# Patient Record
Sex: FEMALE | Race: WHITE | Employment: UNEMPLOYED | URBAN - METROPOLITAN AREA
[De-identification: names, ages, dates, MRNs, and addresses within clinical notes are randomized per-mention and may not be internally consistent; named-entity substitution may affect disease eponyms.]

---

## 2017-02-15 ENCOUNTER — OFFICE VISIT (OUTPATIENT)
Dept: ORTHOPEDIC SURGERY | Age: 77
End: 2017-02-15

## 2017-02-15 VITALS
HEART RATE: 86 BPM | BODY MASS INDEX: 22.09 KG/M2 | DIASTOLIC BLOOD PRESSURE: 76 MMHG | SYSTOLIC BLOOD PRESSURE: 155 MMHG | RESPIRATION RATE: 16 BRPM | HEIGHT: 61 IN | WEIGHT: 117 LBS

## 2017-02-15 DIAGNOSIS — S72.142A INTERTROCHANTERIC FRACTURE OF LEFT FEMUR, CLOSED, INITIAL ENCOUNTER (HCC): Primary | ICD-10-CM

## 2017-02-15 PROCEDURE — 99213 OFFICE O/P EST LOW 20 MIN: CPT | Performed by: ORTHOPAEDIC SURGERY

## 2017-02-15 PROCEDURE — 73502 X-RAY EXAM HIP UNI 2-3 VIEWS: CPT | Performed by: ORTHOPAEDIC SURGERY

## 2018-08-06 ENCOUNTER — TELEPHONE (OUTPATIENT)
Dept: ORTHOPEDIC SURGERY | Age: 78
End: 2018-08-06

## 2018-08-06 NOTE — TELEPHONE ENCOUNTER
Called and left message to call office back    ** from what record we have the patient was under general anesthesia.  If she wants specifics she is to contact the hospital**

## 2020-07-04 ENCOUNTER — APPOINTMENT (OUTPATIENT)
Dept: CT IMAGING | Age: 80
DRG: 512 | End: 2020-07-04
Payer: MEDICARE

## 2020-07-04 ENCOUNTER — HOSPITAL ENCOUNTER (INPATIENT)
Age: 80
LOS: 1 days | Discharge: HOME | DRG: 512 | End: 2020-07-05
Attending: INTERNAL MEDICINE | Admitting: INTERNAL MEDICINE
Payer: MEDICARE

## 2020-07-04 ENCOUNTER — APPOINTMENT (OUTPATIENT)
Dept: GENERAL RADIOLOGY | Age: 80
DRG: 512 | End: 2020-07-04
Payer: MEDICARE

## 2020-07-04 ENCOUNTER — ANESTHESIA (OUTPATIENT)
Dept: OPERATING ROOM | Age: 80
DRG: 512 | End: 2020-07-04
Payer: MEDICARE

## 2020-07-04 ENCOUNTER — ANESTHESIA EVENT (OUTPATIENT)
Dept: OPERATING ROOM | Age: 80
DRG: 512 | End: 2020-07-04
Payer: MEDICARE

## 2020-07-04 VITALS
SYSTOLIC BLOOD PRESSURE: 117 MMHG | DIASTOLIC BLOOD PRESSURE: 55 MMHG | RESPIRATION RATE: 5 BRPM | OXYGEN SATURATION: 100 %

## 2020-07-04 PROBLEM — S62.102B LEFT WRIST FRACTURE, OPEN, INITIAL ENCOUNTER: Status: ACTIVE | Noted: 2020-07-04

## 2020-07-04 LAB
A/G RATIO: 1.4 (ref 1.1–2.2)
ALBUMIN SERPL-MCNC: 4.3 G/DL (ref 3.4–5)
ALP BLD-CCNC: 73 U/L (ref 40–129)
ALT SERPL-CCNC: 7 U/L (ref 10–40)
ANION GAP SERPL CALCULATED.3IONS-SCNC: 12 MMOL/L (ref 3–16)
AST SERPL-CCNC: 20 U/L (ref 15–37)
BASOPHILS ABSOLUTE: 0.1 K/UL (ref 0–0.2)
BASOPHILS RELATIVE PERCENT: 0.5 %
BILIRUB SERPL-MCNC: <0.2 MG/DL (ref 0–1)
BUN BLDV-MCNC: 18 MG/DL (ref 7–20)
CALCIUM SERPL-MCNC: 9.6 MG/DL (ref 8.3–10.6)
CHLORIDE BLD-SCNC: 101 MMOL/L (ref 99–110)
CO2: 23 MMOL/L (ref 21–32)
CREAT SERPL-MCNC: 0.9 MG/DL (ref 0.6–1.2)
EOSINOPHILS ABSOLUTE: 0 K/UL (ref 0–0.6)
EOSINOPHILS RELATIVE PERCENT: 0.4 %
GFR AFRICAN AMERICAN: >60
GFR NON-AFRICAN AMERICAN: 60
GLOBULIN: 3.1 G/DL
GLUCOSE BLD-MCNC: 124 MG/DL (ref 70–99)
HCT VFR BLD CALC: 41.6 % (ref 36–48)
HEMOGLOBIN: 13.8 G/DL (ref 12–16)
LYMPHOCYTES ABSOLUTE: 1.1 K/UL (ref 1–5.1)
LYMPHOCYTES RELATIVE PERCENT: 9.4 %
MCH RBC QN AUTO: 30.9 PG (ref 26–34)
MCHC RBC AUTO-ENTMCNC: 33.2 G/DL (ref 31–36)
MCV RBC AUTO: 93.1 FL (ref 80–100)
MONOCYTES ABSOLUTE: 0.5 K/UL (ref 0–1.3)
MONOCYTES RELATIVE PERCENT: 4.2 %
NEUTROPHILS ABSOLUTE: 10.3 K/UL (ref 1.7–7.7)
NEUTROPHILS RELATIVE PERCENT: 85.5 %
PDW BLD-RTO: 13.9 % (ref 12.4–15.4)
PLATELET # BLD: 264 K/UL (ref 135–450)
PMV BLD AUTO: 9.2 FL (ref 5–10.5)
POTASSIUM REFLEX MAGNESIUM: 4.4 MMOL/L (ref 3.5–5.1)
RBC # BLD: 4.47 M/UL (ref 4–5.2)
SARS-COV-2, NAAT: NOT DETECTED
SODIUM BLD-SCNC: 136 MMOL/L (ref 136–145)
TOTAL PROTEIN: 7.4 G/DL (ref 6.4–8.2)
WBC # BLD: 12.1 K/UL (ref 4–11)

## 2020-07-04 PROCEDURE — 1200000000 HC SEMI PRIVATE

## 2020-07-04 PROCEDURE — U0002 COVID-19 LAB TEST NON-CDC: HCPCS

## 2020-07-04 PROCEDURE — 7100000000 HC PACU RECOVERY - FIRST 15 MIN: Performed by: ORTHOPAEDIC SURGERY

## 2020-07-04 PROCEDURE — 2580000003 HC RX 258: Performed by: PHYSICIAN ASSISTANT

## 2020-07-04 PROCEDURE — 2709999900 HC NON-CHARGEABLE SUPPLY: Performed by: ORTHOPAEDIC SURGERY

## 2020-07-04 PROCEDURE — 0HQ1XZZ REPAIR FACE SKIN, EXTERNAL APPROACH: ICD-10-PCS | Performed by: ORTHOPAEDIC SURGERY

## 2020-07-04 PROCEDURE — 2720000010 HC SURG SUPPLY STERILE: Performed by: ORTHOPAEDIC SURGERY

## 2020-07-04 PROCEDURE — 6360000002 HC RX W HCPCS: Performed by: ANESTHESIOLOGY

## 2020-07-04 PROCEDURE — 73110 X-RAY EXAM OF WRIST: CPT

## 2020-07-04 PROCEDURE — 0PSJ04Z REPOSITION LEFT RADIUS WITH INTERNAL FIXATION DEVICE, OPEN APPROACH: ICD-10-PCS | Performed by: ORTHOPAEDIC SURGERY

## 2020-07-04 PROCEDURE — 99285 EMERGENCY DEPT VISIT HI MDM: CPT

## 2020-07-04 PROCEDURE — 6360000002 HC RX W HCPCS: Performed by: ORTHOPAEDIC SURGERY

## 2020-07-04 PROCEDURE — G0378 HOSPITAL OBSERVATION PER HR: HCPCS

## 2020-07-04 PROCEDURE — 99222 1ST HOSP IP/OBS MODERATE 55: CPT | Performed by: ORTHOPAEDIC SURGERY

## 2020-07-04 PROCEDURE — 2500000003 HC RX 250 WO HCPCS: Performed by: ORTHOPAEDIC SURGERY

## 2020-07-04 PROCEDURE — 85025 COMPLETE CBC W/AUTO DIFF WBC: CPT

## 2020-07-04 PROCEDURE — 11012 DEB SKIN BONE AT FX SITE: CPT | Performed by: ORTHOPAEDIC SURGERY

## 2020-07-04 PROCEDURE — 3600000004 HC SURGERY LEVEL 4 BASE: Performed by: ORTHOPAEDIC SURGERY

## 2020-07-04 PROCEDURE — 3600000014 HC SURGERY LEVEL 4 ADDTL 15MIN: Performed by: ORTHOPAEDIC SURGERY

## 2020-07-04 PROCEDURE — 7100000001 HC PACU RECOVERY - ADDTL 15 MIN: Performed by: ORTHOPAEDIC SURGERY

## 2020-07-04 PROCEDURE — 73100 X-RAY EXAM OF WRIST: CPT

## 2020-07-04 PROCEDURE — 96365 THER/PROPH/DIAG IV INF INIT: CPT

## 2020-07-04 PROCEDURE — 25609 OPTX DST RD XART FX/EP SEP3+: CPT | Performed by: ORTHOPAEDIC SURGERY

## 2020-07-04 PROCEDURE — 90471 IMMUNIZATION ADMIN: CPT | Performed by: PHYSICIAN ASSISTANT

## 2020-07-04 PROCEDURE — 90715 TDAP VACCINE 7 YRS/> IM: CPT | Performed by: PHYSICIAN ASSISTANT

## 2020-07-04 PROCEDURE — 70450 CT HEAD/BRAIN W/O DYE: CPT

## 2020-07-04 PROCEDURE — 70480 CT ORBIT/EAR/FOSSA W/O DYE: CPT

## 2020-07-04 PROCEDURE — 6370000000 HC RX 637 (ALT 250 FOR IP): Performed by: ORTHOPAEDIC SURGERY

## 2020-07-04 PROCEDURE — 3700000001 HC ADD 15 MINUTES (ANESTHESIA): Performed by: ORTHOPAEDIC SURGERY

## 2020-07-04 PROCEDURE — 6360000002 HC RX W HCPCS: Performed by: PHYSICIAN ASSISTANT

## 2020-07-04 PROCEDURE — 2580000003 HC RX 258: Performed by: ORTHOPAEDIC SURGERY

## 2020-07-04 PROCEDURE — C1713 ANCHOR/SCREW BN/BN,TIS/BN: HCPCS | Performed by: ORTHOPAEDIC SURGERY

## 2020-07-04 PROCEDURE — 3209999900 FLUORO FOR SURGICAL PROCEDURES

## 2020-07-04 PROCEDURE — 12013 RPR F/E/E/N/L/M 2.6-5.0 CM: CPT

## 2020-07-04 PROCEDURE — 80053 COMPREHEN METABOLIC PANEL: CPT

## 2020-07-04 PROCEDURE — 3700000000 HC ANESTHESIA ATTENDED CARE: Performed by: ORTHOPAEDIC SURGERY

## 2020-07-04 DEVICE — LOCKING SCREW, FULLY THREADED,T8
Type: IMPLANTABLE DEVICE | Site: RADIUS | Status: FUNCTIONAL
Brand: VARIAX

## 2020-07-04 DEVICE — VOLAR SMARTLOCK DR PL.NARROW,LEFT,LONG
Type: IMPLANTABLE DEVICE | Site: RADIUS | Status: FUNCTIONAL
Brand: VARIAX

## 2020-07-04 DEVICE — BONE SCREW, FULLY THREADED, T8
Type: IMPLANTABLE DEVICE | Site: RADIUS | Status: FUNCTIONAL
Brand: VARIAX

## 2020-07-04 RX ORDER — FENTANYL CITRATE 50 UG/ML
INJECTION, SOLUTION INTRAMUSCULAR; INTRAVENOUS PRN
Status: DISCONTINUED | OUTPATIENT
Start: 2020-07-04 | End: 2020-07-04 | Stop reason: SDUPTHER

## 2020-07-04 RX ORDER — ACETAMINOPHEN 325 MG/1
650 TABLET ORAL EVERY 6 HOURS PRN
Status: CANCELLED | OUTPATIENT
Start: 2020-07-04

## 2020-07-04 RX ORDER — SODIUM CHLORIDE 450 MG/100ML
INJECTION, SOLUTION INTRAVENOUS CONTINUOUS
Status: DISCONTINUED | OUTPATIENT
Start: 2020-07-04 | End: 2020-07-05 | Stop reason: HOSPADM

## 2020-07-04 RX ORDER — ACETAMINOPHEN 500 MG
1000 TABLET ORAL EVERY 8 HOURS SCHEDULED
Status: DISCONTINUED | OUTPATIENT
Start: 2020-07-04 | End: 2020-07-05 | Stop reason: HOSPADM

## 2020-07-04 RX ORDER — TRAMADOL HYDROCHLORIDE 50 MG/1
100 TABLET ORAL EVERY 6 HOURS PRN
Status: DISCONTINUED | OUTPATIENT
Start: 2020-07-04 | End: 2020-07-05 | Stop reason: HOSPADM

## 2020-07-04 RX ORDER — SENNA AND DOCUSATE SODIUM 50; 8.6 MG/1; MG/1
1 TABLET, FILM COATED ORAL 2 TIMES DAILY
Status: DISCONTINUED | OUTPATIENT
Start: 2020-07-04 | End: 2020-07-05 | Stop reason: HOSPADM

## 2020-07-04 RX ORDER — PROPOFOL 10 MG/ML
INJECTION, EMULSION INTRAVENOUS PRN
Status: DISCONTINUED | OUTPATIENT
Start: 2020-07-04 | End: 2020-07-04 | Stop reason: SDUPTHER

## 2020-07-04 RX ORDER — SODIUM CHLORIDE 0.9 % (FLUSH) 0.9 %
10 SYRINGE (ML) INJECTION EVERY 12 HOURS SCHEDULED
Status: DISCONTINUED | OUTPATIENT
Start: 2020-07-04 | End: 2020-07-05 | Stop reason: HOSPADM

## 2020-07-04 RX ORDER — ACETAMINOPHEN 650 MG/1
650 SUPPOSITORY RECTAL EVERY 6 HOURS PRN
Status: CANCELLED | OUTPATIENT
Start: 2020-07-04

## 2020-07-04 RX ORDER — TRAMADOL HYDROCHLORIDE 50 MG/1
50 TABLET ORAL EVERY 6 HOURS PRN
Status: DISCONTINUED | OUTPATIENT
Start: 2020-07-04 | End: 2020-07-05 | Stop reason: HOSPADM

## 2020-07-04 RX ORDER — POLYETHYLENE GLYCOL 3350 17 G/17G
17 POWDER, FOR SOLUTION ORAL DAILY PRN
Status: DISCONTINUED | OUTPATIENT
Start: 2020-07-04 | End: 2020-07-05 | Stop reason: HOSPADM

## 2020-07-04 RX ORDER — SODIUM CHLORIDE 0.9 % (FLUSH) 0.9 %
10 SYRINGE (ML) INJECTION PRN
Status: DISCONTINUED | OUTPATIENT
Start: 2020-07-04 | End: 2020-07-05 | Stop reason: HOSPADM

## 2020-07-04 RX ORDER — BUPIVACAINE HYDROCHLORIDE 5 MG/ML
INJECTION, SOLUTION EPIDURAL; INTRACAUDAL
Status: COMPLETED | OUTPATIENT
Start: 2020-07-04 | End: 2020-07-04

## 2020-07-04 RX ORDER — MORPHINE SULFATE 2 MG/ML
2 INJECTION, SOLUTION INTRAMUSCULAR; INTRAVENOUS
Status: DISCONTINUED | OUTPATIENT
Start: 2020-07-04 | End: 2020-07-05 | Stop reason: HOSPADM

## 2020-07-04 RX ORDER — ONDANSETRON 2 MG/ML
4 INJECTION INTRAMUSCULAR; INTRAVENOUS EVERY 6 HOURS PRN
Status: DISCONTINUED | OUTPATIENT
Start: 2020-07-04 | End: 2020-07-05 | Stop reason: HOSPADM

## 2020-07-04 RX ORDER — IBUPROFEN 600 MG/1
600 TABLET ORAL EVERY 6 HOURS PRN
Status: DISCONTINUED | OUTPATIENT
Start: 2020-07-04 | End: 2020-07-05 | Stop reason: HOSPADM

## 2020-07-04 RX ORDER — PROMETHAZINE HYDROCHLORIDE 25 MG/1
12.5 TABLET ORAL EVERY 6 HOURS PRN
Status: DISCONTINUED | OUTPATIENT
Start: 2020-07-04 | End: 2020-07-05 | Stop reason: HOSPADM

## 2020-07-04 RX ADMIN — DOCUSATE SODIUM AND SENNOSIDES 1 TABLET: 8.6; 5 TABLET, FILM COATED ORAL at 20:44

## 2020-07-04 RX ADMIN — FENTANYL CITRATE 50 MCG: 50 INJECTION INTRAMUSCULAR; INTRAVENOUS at 14:56

## 2020-07-04 RX ADMIN — SODIUM CHLORIDE, PRESERVATIVE FREE 10 ML: 5 INJECTION INTRAVENOUS at 20:44

## 2020-07-04 RX ADMIN — CEFAZOLIN 2 G: 10 INJECTION, POWDER, FOR SOLUTION INTRAVENOUS at 20:44

## 2020-07-04 RX ADMIN — CEFAZOLIN SODIUM 2 G: 1 INJECTION, POWDER, FOR SOLUTION INTRAMUSCULAR; INTRAVENOUS at 13:24

## 2020-07-04 RX ADMIN — PROPOFOL 150 MG: 10 INJECTION, EMULSION INTRAVENOUS at 14:38

## 2020-07-04 RX ADMIN — SODIUM CHLORIDE, PRESERVATIVE FREE 10 ML: 5 INJECTION INTRAVENOUS at 20:45

## 2020-07-04 RX ADMIN — PROPOFOL 150 MG: 10 INJECTION, EMULSION INTRAVENOUS at 14:45

## 2020-07-04 RX ADMIN — FENTANYL CITRATE 50 MCG: 50 INJECTION INTRAMUSCULAR; INTRAVENOUS at 14:38

## 2020-07-04 RX ADMIN — SODIUM CHLORIDE: 4.5 INJECTION, SOLUTION INTRAVENOUS at 20:43

## 2020-07-04 RX ADMIN — TETANUS TOXOID, REDUCED DIPHTHERIA TOXOID AND ACELLULAR PERTUSSIS VACCINE, ADSORBED 0.5 ML: 5; 2.5; 8; 8; 2.5 SUSPENSION INTRAMUSCULAR at 13:28

## 2020-07-04 RX ADMIN — ASPIRIN 325 MG: 325 TABLET, DELAYED RELEASE ORAL at 20:43

## 2020-07-04 RX ADMIN — ACETAMINOPHEN 1000 MG: 500 TABLET ORAL at 21:45

## 2020-07-04 ASSESSMENT — PULMONARY FUNCTION TESTS
PIF_VALUE: 2
PIF_VALUE: 13
PIF_VALUE: 3
PIF_VALUE: 0
PIF_VALUE: 3
PIF_VALUE: 4
PIF_VALUE: 3
PIF_VALUE: 4
PIF_VALUE: 1
PIF_VALUE: 3
PIF_VALUE: 0
PIF_VALUE: 4
PIF_VALUE: 2
PIF_VALUE: 3
PIF_VALUE: 3
PIF_VALUE: 4
PIF_VALUE: 0
PIF_VALUE: 3
PIF_VALUE: 4
PIF_VALUE: 0
PIF_VALUE: 1
PIF_VALUE: 3
PIF_VALUE: 3
PIF_VALUE: 4
PIF_VALUE: 3
PIF_VALUE: 3
PIF_VALUE: 4
PIF_VALUE: 3
PIF_VALUE: 4
PIF_VALUE: 4
PIF_VALUE: 3
PIF_VALUE: 4
PIF_VALUE: 3
PIF_VALUE: 3
PIF_VALUE: 4
PIF_VALUE: 3
PIF_VALUE: 3
PIF_VALUE: 4
PIF_VALUE: 3
PIF_VALUE: 3
PIF_VALUE: 4
PIF_VALUE: 4
PIF_VALUE: 3
PIF_VALUE: 3
PIF_VALUE: 1
PIF_VALUE: 3
PIF_VALUE: 4
PIF_VALUE: 3
PIF_VALUE: 21
PIF_VALUE: 3
PIF_VALUE: 4
PIF_VALUE: 3
PIF_VALUE: 4
PIF_VALUE: 3
PIF_VALUE: 4
PIF_VALUE: 4
PIF_VALUE: 2
PIF_VALUE: 3
PIF_VALUE: 4
PIF_VALUE: 3
PIF_VALUE: 4
PIF_VALUE: 3

## 2020-07-04 ASSESSMENT — ENCOUNTER SYMPTOMS
BACK PAIN: 0
ABDOMINAL PAIN: 0
CHEST TIGHTNESS: 0
VOMITING: 0
NAUSEA: 0
SHORTNESS OF BREATH: 0
SHORTNESS OF BREATH: 0

## 2020-07-04 ASSESSMENT — PAIN DESCRIPTION - LOCATION
LOCATION: WRIST
LOCATION: WRIST

## 2020-07-04 ASSESSMENT — PAIN SCALES - GENERAL
PAINLEVEL_OUTOF10: 0
PAINLEVEL_OUTOF10: 5
PAINLEVEL_OUTOF10: 0
PAINLEVEL_OUTOF10: 4
PAINLEVEL_OUTOF10: 0

## 2020-07-04 ASSESSMENT — PAIN DESCRIPTION - ORIENTATION
ORIENTATION: LEFT
ORIENTATION: LEFT

## 2020-07-04 ASSESSMENT — PAIN DESCRIPTION - PAIN TYPE
TYPE: ACUTE PAIN
TYPE: ACUTE PAIN

## 2020-07-04 ASSESSMENT — PAIN - FUNCTIONAL ASSESSMENT: PAIN_FUNCTIONAL_ASSESSMENT: 0-10

## 2020-07-04 ASSESSMENT — PAIN DESCRIPTION - DESCRIPTORS
DESCRIPTORS: SHARP
DESCRIPTORS: SHARP

## 2020-07-04 NOTE — PROGRESS NOTES
4 Eyes Admission Assessment     I agree as the admission nurse that 2 RN's have performed a thorough Head to Toe Skin Assessment on the patient. ALL assessment sites listed below have been assessed on admission. Areas assessed by both nurses:   [x]   Head, Face, and Ears   [x]   Shoulders, Back, and Chest  [x]   Arms, Elbows, and Hands   [x]   Coccyx, Sacrum, and Ischum  [x]   Legs, Feet, and Heels        Does the Patient have Skin Breakdown?   No         Gildardo Prevention initiated:  NA   Wound Care Orders initiated:  NA      WOC nurse consulted for Pressure Injury (Stage 3,4, Unstageable, DTI, NWPT, and Complex wounds):  NA      Nurse 1 eSignature: Electronically signed by Meir Howe RN on 7/4/20 at 4:58 PM EDT    **SHARE this note so that the co-signing nurse is able to place an eSignature**    Nurse 2 eSignature: Electronically signed by Kylah Jones RN on 7/4/20 at 5:11 PM EDT

## 2020-07-04 NOTE — H&P
Hospital Medicine History & Physical      PCP: Anne GUTIERREZ    Date of Admission: 7/4/2020    Date of Service: Pt seen/examined on 7/4/20 and Admitted to Inpatient     Chief Complaint: Left wrist fracture    History Of Present Illness: The patient is a 78 y.o. female who presents to [facility] with fall and left wrist pain. Patient was working outside when she was walking between 2 cement pillars when she lost her footing on a graded hill and fell on an outstretched hand. She immediately had pain and her grandson brought her to the emergency department. X-ray of the wrist shows a acute severely comminuted and displaced distal radial fracture along with fracture of the ulnar styloid. Orthopedic surgery was consulted and they will take her to the OR this afternoon for an open reduction and internal fixation. Patient denies any fever, chills, nausea, vomiting, diarrhea, constipation, chest pain or shortness of breath. She was tested for COVID-19 preoperatively. Past Medical History:        Diagnosis Date    Cataract     Colon cancer ([facility] [address] 2010       Past Surgical History:        Procedure Laterality Date    KNEE SURGERY      OTHER SURGICAL HISTORY  8/7/15    OPEN REDUCTION INTERNAL FIXATION OF LEFT HIP USING GAMMA NAIL       Medications Prior to Admission:    Prior to Admission medications    Medication Sig Start Date End Date Taking? Authorizing Provider   aspirin 325 MG EC tablet Take 1 tablet by mouth daily Last dose 9/8/15 8/18/15 7/4/20 Yes Roddy Mcfarland MD   Multiple Vitamins-Minerals (THERAPEUTIC MULTIVITAMIN-MINERALS) tablet Take 1 tablet by mouth daily   Yes Historical Provider, MD   NONFORMULARY Bone up    Historical Provider, MD       Allergies:  Patient has no known allergies.     Social History:  The patient currently lives at home    TOBACCO:   reports that she has been smoking. She has never used smokeless tobacco.  ETOH:   reports no history of alcohol use. Family History:  Reviewed in detail and negative for DM, Early CAD, Cancer, CVA. Positive as follows:    History reviewed. No pertinent family history. REVIEW OF SYSTEMS:   Positive for as noted in the HPI. All other systems reviewed and negative. PHYSICAL EXAM:    BP (!) 154/67   Pulse 92   Temp 98.7 °F (37.1 °C) (Oral)   Resp 16   Wt 128 lb 12 oz (58.4 kg)   SpO2 99%   BMI 24.33 kg/m²     General appearance: No apparent distress appears stated age and cooperative. HEENT Normal cephalic, atraumatic without obvious deformity. Pupils equal, round, and reactive to light. Extra ocular muscles intact. Conjunctivae/corneas clear. Neck: Supple, No jugular venous distention/bruits. Trachea midline without thyromegaly or adenopathy with full range of motion. Lungs: Clear to auscultation, bilaterally without Rales/Wheezes/Rhonchi with good respiratory effort. Heart: Regular rate and rhythm with Normal S1/S2 without murmurs, rubs or gallops, point of maximum impulse non-displaced  Abdomen: Soft, non-tender or non-distended without rigidity or guarding and positive bowel sounds all four quadrants. Extremities: Left wrist displaced  Skin: Skin color, texture, turgor normal.  No rashes or lesions. Neurologic: Alert and oriented X 3, neurovascularly intact with sensory/motor intact upper extremities/lower extremities, bilaterally. Cranial nerves: II-XII intact, grossly non-focal.  Mental status: Alert, oriented, thought content appropriate.   Capillary Refill: Acceptable  < 3 seconds  Peripheral Pulses: +3 Easily felt, not easily obliterated with pressure      XR:  I have reviewed the XR with the following interpretation: displaced L radial fracture      CBC   Recent Labs     07/04/20  1302   WBC 12.1*   HGB 13.8   HCT 41.6         RENAL  Recent Labs

## 2020-07-04 NOTE — ED TRIAGE NOTES
Patient arrived via private car after a trip and fall this AM at 1000. She has a small abrasion to the left eyebrow and a left wrist deformity with an abrasion.

## 2020-07-04 NOTE — ANESTHESIA PRE PROCEDURE
Department of Anesthesiology  Preprocedure Note       Name:  Lo Garner   Age:  78 y.o.  :  1940                                          MRN:  [id number]         Date:  2020      Surgeon: Keyonna Mcgee):  Niesha Katz MD    Procedure: Procedure(s):  RADIUS OPEN REDUCTION INTERNAL FIXATION  HAND INCISION AND DRAINAGE    Medications prior to admission:   Prior to Admission medications    Medication Sig Start Date End Date Taking? Authorizing Provider   aspirin 325 MG EC tablet Take 1 tablet by mouth daily Last dose 9/8/15 8/18/15 7/4/20 Yes Summer Toscano MD   Multiple Vitamins-Minerals (THERAPEUTIC MULTIVITAMIN-MINERALS) tablet Take 1 tablet by mouth daily   Yes Historical Provider, MD   NONFORMULARY Bone up    Historical Provider, MD       Current medications:    No current facility-administered medications for this encounter.       Current Outpatient Medications   Medication Sig Dispense Refill    aspirin 325 MG EC tablet Take 1 tablet by mouth daily Last dose 9/8/15 30 tablet 0    Multiple Vitamins-Minerals (THERAPEUTIC MULTIVITAMIN-MINERALS) tablet Take 1 tablet by mouth daily      NONFORMULARY Bone up         Allergies:  No Known Allergies    Problem List:    Patient Active Problem List   Diagnosis Code    Hip fracture ([address] S72.009A    Closed left hip fracture ([address] S72.002A    Intertrochanteric fracture of left femur ([address] S72.142A    Left wrist fracture, open, initial encounter S62.102B       Past Medical History:        Diagnosis Date    Cataract     Colon cancer ([address]        Past Surgical History:        Procedure Laterality Date    KNEE SURGERY      OTHER SURGICAL HISTORY  8/7/15    OPEN REDUCTION INTERNAL FIXATION OF LEFT HIP USING GAMMA NAIL       Social History:    Social History     Tobacco Use    Smoking status: Current Every Day Smoker    Smokeless tobacco: Never Used    Tobacco comment: about 5 a day   Substance Use Topics    Alcohol use: No     Alcohol/week: 0.0 standard drinks                                Ready to quit: Not Answered  Counseling given: Not Answered  Comment: about 5 a day      Vital Signs (Current):   Vitals:    07/04/20 1154 07/04/20 1159 07/04/20 1401   BP: (!) 154/67  (!) 167/84   Pulse: 92     Resp: 16     Temp:  98.7 °F (37.1 °C)    TempSrc:  Oral    SpO2: 99%  98%   Weight: 128 lb 12 oz (58.4 kg)                                                BP Readings from Last 3 Encounters:   07/04/20 (!) 167/84   02/15/17 (!) 155/76   12/09/16 (!) 158/75       NPO Status:                                                                                 BMI:   Wt Readings from Last 3 Encounters:   07/04/20 128 lb 12 oz (58.4 kg)   02/15/17 117 lb (53.1 kg)   12/09/16 117 lb (53.1 kg)     Body mass index is 24.33 kg/m². CBC:   Lab Results   Component Value Date    WBC 12.1 07/04/2020    RBC 4.47 07/04/2020    HGB 13.8 07/04/2020    HCT 41.6 07/04/2020    MCV 93.1 07/04/2020    RDW 13.9 07/04/2020     07/04/2020       CMP:   Lab Results   Component Value Date     07/04/2020    K 4.4 07/04/2020     07/04/2020    CO2 23 07/04/2020    BUN 18 07/04/2020    CREATININE 0.9 07/04/2020    GFRAA >60 07/04/2020    GFRAA >60 02/14/2010    AGRATIO 1.4 07/04/2020    LABGLOM 60 07/04/2020    GLUCOSE 124 07/04/2020    PROT 7.4 07/04/2020    CALCIUM 9.6 07/04/2020    BILITOT <0.2 07/04/2020    ALKPHOS 73 07/04/2020    AST 20 07/04/2020    ALT 7 07/04/2020       POC Tests: No results for input(s): POCGLU, POCNA, POCK, POCCL, POCBUN, POCHEMO, POCHCT in the last 72 hours.     Coags:   Lab Results   Component Value Date    PROTIME 10.5 08/14/2015    INR 0.97 08/14/2015       HCG (If Applicable): No results found for: PREGTESTUR, PREGSERUM, HCG, HCGQUANT     ABGs:   Lab Results   Component Value Date    PHART 7.451 08/08/2015    PO2ART 177.6 08/08/2015    EYQ9PNL 35.8 08/08/2015    OIC3HGR 24.4 08/08/2015    BEART 0.6 08/08/2015    G1XUBPUO 99.6 08/08/2015 Type & Screen (If Applicable):  Lab Results   Component Value Date    LABABO A 02/11/2010    LABRH Positive 02/11/2010       Drug/Infectious Status (If Applicable):  No results found for: HIV, HEPCAB    COVID-19 Screening (If Applicable): No results found for: COVID19      Anesthesia Evaluation  Patient summary reviewed  Airway: Mallampati: II  TM distance: >3 FB   Neck ROM: full  Mouth opening: > = 3 FB Dental:          Pulmonary:       (-) COPD, asthma and shortness of breath                           Cardiovascular:        (-) hypertension, valvular problems/murmurs, past MI, CAD, CABG/stent, dysrhythmias and  angina                Neuro/Psych:      (-) seizures, TIA and CVA           GI/Hepatic/Renal:        (-) GERD, PUD, liver disease and no renal disease       Endo/Other:    (+) malignancy/cancer (colon). (-) diabetes mellitus               Abdominal:           Vascular: negative vascular ROS. Anesthesia Plan      general     ASA 2 - emergent     (I discussed with the patient the risks and benefits of PIV, general anesthesia, IV Narcotics, PACU. All questions were answered the patient agrees with the plan.)  Induction: intravenous. Anesthetic plan and risks discussed with patient. Plan discussed with CRNA.                   Tere Carson MD   7/4/2020

## 2020-07-04 NOTE — CONSULTS
[facility] Orthopedic Surgery  Consult Note        This patient is seen in consultation at the request of Yoko Coburn PA-C    Reason for Consult:  Left wrist pain/ markedly displaced distal radius intraarticular open fracture. CHIEF COMPLAINT:  Left wrist pain/ fall. History Obtained From:  patient, electronic medical record    HISTORY OF PRESENT ILLNESS:    Ms. Swapna Goldberg is a 78 y.o.  female well known to me right handed who presents today to [facility] ED for evaluation of a left wrist injury. The patient reports that this injury occurred when she fell at a parking lot after tripping on cement stopper. She was first seen and evaluated in [facility] ED, when she was x-rayed and I was consulted. The patient denies any other injuries. Rates pain a 10/10 VAS moderate, sharp, constant and show no change. Movement makes the pain worse, the splint and resting makes the pain better. Alleviating factors rest. No numbness or tingling sensation. The patient is known to me for left intertrochanteric femur comminuted fracture, status post Gamma nail, 8/7/2015. Past Medical History:        Diagnosis Date    Cataract     Colon cancer ([facility] [address] 2010       Past Surgical History:        Procedure Laterality Date    KNEE SURGERY      OTHER SURGICAL HISTORY  8/7/15    OPEN REDUCTION INTERNAL FIXATION OF LEFT HIP USING GAMMA NAIL       Medications prior to admission:   Prior to Admission medications    Medication Sig Start Date End Date Taking? Authorizing Provider   aspirin 325 MG EC tablet Take 1 tablet by mouth daily Last dose 9/8/15 8/18/15 7/4/20 Yes Edilson Meza MD   Multiple Vitamins-Minerals (THERAPEUTIC MULTIVITAMIN-MINERALS) tablet Take 1 tablet by mouth daily   Yes Historical Provider, MD   NONFORMULARY Bone up    Historical Provider, MD       Current Medications:   No current facility-administered medications for this encounter. Allergies:  Patient has no known allergies.     Social History normal                DATA:    CBC:   Lab Results   Component Value Date    WBC 12.1 07/04/2020    RBC 4.47 07/04/2020    HGB 13.8 07/04/2020    HCT 41.6 07/04/2020    MCV 93.1 07/04/2020    MCH 30.9 07/04/2020    MCHC 33.2 07/04/2020    RDW 13.9 07/04/2020     07/04/2020    MPV 9.2 07/04/2020     WBC:    Lab Results   Component Value Date    WBC 12.1 07/04/2020     PT/INR:    Lab Results   Component Value Date    PROTIME 10.5 08/14/2015    INR 0.97 08/14/2015     PTT:  No results found for: APTT[APTT    IMAGING: Xrays dated 7/4/2020, 3 views of left wrist were reviewed, and showed markedly displaced distal radius fracture. IMPRESSION: Left wrist pain/ markedly displaced distal radius intraarticular open fracture. PLAN:  I discussed with Yolie Cade the overall alignment of the fracture and treatment options including both surgical and non-surgical treatment, and that my recommendation is an I&D and open reduction and internal fixation given the amount of displacement and comminution of the fracture. I discussed the risks and benefits of surgery with the patient, including but not limited to infection, bleeding, pain, injury to nerves or blood vessels failure of the surgery and need for additional surgery. All the patient's questions were answered. We discussed an expected post-operative course. She  is understanding of this and wishes to proceed. Surgery this afternoon. Thank you very much for the kind consultation and allowing me to participate in this patient's care. I will continue to keep you apprised of her progress.           Dee Dee Abbott MD   7/4/2020  1:24 PM

## 2020-07-04 NOTE — PROGRESS NOTES
Patient alert and oriented times four. Phone call made to dietary to determine where her meal tray is. Fall risk assessment completed. Fall precautions in place. Call light within reach. Pt educated on calling for assistance before getting up. Walkway free of clutter. Will continue to monitor.    Electronically signed by Prabha Amaro RN on 7/4/2020 at 7:21 PM

## 2020-07-04 NOTE — BRIEF OP NOTE
Brief Postoperative Note      Patient: Gudelia Alexis  YOB: 1940  MRN: [id number]    Date of Procedure: 7/4/2020    Pre-Op Diagnosis: Left distal radius open fracture    Post-Op Diagnosis: Same       Procedure(s):  OPEN REDUCTION INTERNAL FIXATION distal radius open fracture  INCISION AND DRAINAGE left wrist    Surgeon(s):  Lluvia Mario MD    Assistant:  Surgical Assistant: Letty Leventhal    Anesthesia: General    Estimated Blood Loss (mL): Minimal    Complications: None    Specimens:   * No specimens in log *    Implants:  Implant Name Type Inv. Item Serial No.  Lot No. LRB No. Used Action   PLATE SADIA RAD VOLAR [address] L Screw/Plate/Nail/Tomasz PLATE SADIA RAD VOLAR [address] LN L  KRISTEN: [employer]TH[employer]  Left 1 Implanted   SCREW LK 2.7X14MM Screw/Plate/Nail/Tomasz SCREW LK 2.7X14MM  KRISTEN: ORTHOPA[employer]ICS  Left 1 Implanted   SCREW LOCKING 2. [id number] Screw/Plate/Nail/Tomasz SCREW LOCKING 2. [id number]  KRISTEN: ORTHOPAEDICS  Left 1 Implanted   SCREW LOCKING 2. [id number] Screw/Plate/Nail/Tomasz SCREW LOCKING 2. [id number]  KRISTEN: ORTHOPAEDICS  Left 3 Implanted   SCREW LOCKING [id number] Screw/Plate/Nail/Tomasz SCREW LOCKING [id number]  KRISTEN: ORTHOPAEDICS  Left 3 Implanted   SCREW T8 BONE 2. [id number] Screw/Plate/Nail/Tomasz SCREW T8 BONE 2. [id number]  KRISTEN: ORTHOPAEDICS  Left 1 Implanted         Drains: * No LDAs found *    Findings: Same    Electronically signed by Lluvia Mario MD on 7/4/2020 at 5:30 PM

## 2020-07-04 NOTE — PLAN OF CARE
Problem: Falls - Risk of:  Goal: Will remain free from falls  Description: Will remain free from falls  Outcome: Ongoing  Note: Fall risk assessment completed. Fall precautions in place. Call light within reach. Pt educated on calling for assistance before getting up. Walkway free of clutter. Will continue to monitor. Goal: Absence of physical injury  Description: Absence of physical injury  Outcome: Ongoing  Note: Pt is free of injury. No injury noted. Fall precautions in place. Call light within reach. Will monitor. Problem: Infection - Surgical Site:  Goal: Will show no infection signs and symptoms  Description: Will show no infection signs and symptoms  Outcome: Ongoing  Note: Pt shows no signs or symptoms of infection. Will monitor patient, labs and vitals. Problem: Pain:  Goal: Pain level will decrease  Description: Pain level will decrease  Outcome: Ongoing  Note: Pt assessed for pain. Pt denies any pain at this time. Will continue to monitor pt and assess for pain throughout rest of shift. Goal: Control of acute pain  Description: Control of acute pain  Outcome: Ongoing  Note: Pt assessed for pain. Pt denies any pain at this time. Will continue to monitor pt and assess for pain throughout rest of shift.

## 2020-07-04 NOTE — ED PROVIDER NOTES
[facility] EMERGENCY DEPARTMENT  [address] [address]  Dept: [phone]  Loc: [phone]  eMERGENCYdEPARTMENT eNCOUnter      Pt Name: Martina Cruz  MRN: [id number]  [place] 1940  Date of evaluation: 7/4/2020  Provider:Yulisa Lane PA-C    CHIEF COMPLAINT       Chief Complaint   Patient presents with    Fall    Wrist Injury     left wrist injury          HISTORY OF PRESENT ILLNESS  (Location/Symptom, Timing/Onset, Context/Setting, Quality, Duration,Modifying Factors, Severity.)   Martina Cruz is a 78 y.o. female who presents to the emergency department by private vehicle complaining of left wrist and facial injury following a mechanical fall. Patient states she tripped and fell forward. She caught herself with her left hand fully extended. She has a deformity and pain to her left wrist.  Pain rated at 5/10. Denies any numbness or tingling to fingers. Pain worsens with movement. She has not taken thing for pain. After she caught herself with her hands she did hit the left side of her forehead on the ground. She has an abrasion to the lateral aspect of her left eyebrow. She denies any loss of consciousness, visual disturbance, nausea or vomiting. She is on a baby aspirin daily. Denies any pain with movement of her eyeball or eye pain/pressure. Denies any neck pain, back pain, chest wall pain, shortness of breath, abdominal pain. No other reported extremity injuries. No other complaints this time. Fall was mechanical and not secondary to chest pain, shortness breath, syncope, lightheadedness or dizziness. Nursing Notes were reviewedand agreed with or any disagreements were addressed in the HPI. REVIEW OF SYSTEMS    (2-9 systems for level 4, 10 or more for level 5)     Review of Systems   Constitutional: Negative for chills and fever. HENT: Negative.     Respiratory: Negative for chest tightness and shortness of Normal range of motion and neck supple. Musculoskeletal:      Comments: LUE: Left wrist deformity, wiggles fingers without difficulty, cap refill less than 3 seconds, radial pulse +2, sensation intact in fingertips. Open wound, bleeding wound with surrounding petechiae to anterior aspect of left wrist overlying site of deformity   Skin:     General: Skin is warm. Neurological:      General: No focal deficit present. Mental Status: She is alert and oriented to person, place, and time. Cranial Nerves: No cranial nerve deficit. Coordination: Coordination normal.   Psychiatric:         Mood and Affect: Mood normal.         Behavior: Behavior normal.           DIAGNOSTIC RESULTS     EKG: All EKG's are interpreted by the Emergency Department Physician who either signs or Co-signs this chart in the absence of a cardiologist.    RADIOLOGY:   Non-plain film images such as CT, Ultrasound and MRI are read by the radiologist. Plain radiographic images are visualized and preliminarilyinterpreted by the emergency physician with the below findings:    Interpretation per the Radiologist below,if available at the time of this note:    CT HEAD WO CONTRAST   Final Result   No acute intracranial abnormality. CT ORBITS WO CONTRAST   Final Result   No evidence for acute orbital fracture. XR WRIST LEFT (MIN 3 VIEWS)   Final Result   Acute severely comminuted and displaced distal radial fracture with   foreshortening. Fracture of the ulnar styloid. Soft tissue gas present   consistent with laceration and open or compound fracture. Osteoporosis.                LABS:  Labs Reviewed   CBC WITH AUTO DIFFERENTIAL - Abnormal; Notable for the following components:       Result Value    WBC 12.1 (*)     Neutrophils Absolute 10.3 (*)     All other components within normal limits    Narrative:     Performed at:  [place] [address] [place], [place] [address]   Phone [phone]) Infection  Anesthesia (see MAR for exact dosages): Anesthesia method:  None  Laceration details:     Location:  Face    Face location:  L eyebrow    Wound length (cm): 4mm. Repair type:     Repair type:  Simple  Exploration:     Hemostasis achieved with:  Direct pressure  Treatment:     Area cleansed with:  Saline and Shur-Clens    Amount of cleaning:  Standard  Skin repair:     Repair method:  Tissue adhesive  Approximation:     Approximation:  Close  Post-procedure details:     Patient tolerance of procedure: Tolerated well, no immediate complications        FINAL IMPRESSION      1. Left wrist fracture, open, initial encounter    2.  Facial laceration, initial encounter          DISPOSITION/PLAN   @[facility]@    PATIENT REFERRED TO:  Geni Bonilla  [id number]  [phone]            DISCHARGE MEDICATIONS:  New Prescriptions    No medications on file       (Please note that portions of this note were completed with a voice recognition program.  Efforts were made to edit the dictations but occasionally words are mis-transcribed.)    AURORA Walker PA-C  07/04/20 [address] [place] [address]  07/04/20 453

## 2020-07-04 NOTE — PROGRESS NOTES
Pt arrived to PACU from OR. Pt sleeping on room air, awakens easily. Denies c/o pain at this time. Left arm with ace wrap and splint C/D/I. Fingers warm. +pulses noted.  VSS

## 2020-07-05 VITALS
TEMPERATURE: 98.7 F | BODY MASS INDEX: 24.24 KG/M2 | RESPIRATION RATE: 16 BRPM | WEIGHT: 128.31 LBS | DIASTOLIC BLOOD PRESSURE: 70 MMHG | OXYGEN SATURATION: 97 % | HEART RATE: 84 BPM | SYSTOLIC BLOOD PRESSURE: 137 MMHG

## 2020-07-05 LAB
ANION GAP SERPL CALCULATED.3IONS-SCNC: 12 MMOL/L (ref 3–16)
BASOPHILS ABSOLUTE: 0.1 K/UL (ref 0–0.2)
BASOPHILS RELATIVE PERCENT: 0.6 %
BUN BLDV-MCNC: 18 MG/DL (ref 7–20)
CALCIUM SERPL-MCNC: 8.3 MG/DL (ref 8.3–10.6)
CHLORIDE BLD-SCNC: 104 MMOL/L (ref 99–110)
CO2: 22 MMOL/L (ref 21–32)
CREAT SERPL-MCNC: 0.9 MG/DL (ref 0.6–1.2)
EOSINOPHILS ABSOLUTE: 0.2 K/UL (ref 0–0.6)
EOSINOPHILS RELATIVE PERCENT: 2.3 %
GFR AFRICAN AMERICAN: >60
GFR NON-AFRICAN AMERICAN: 60
GLUCOSE BLD-MCNC: 104 MG/DL (ref 70–99)
HCT VFR BLD CALC: 35.4 % (ref 36–48)
HEMOGLOBIN: 11.8 G/DL (ref 12–16)
LYMPHOCYTES ABSOLUTE: 1.8 K/UL (ref 1–5.1)
LYMPHOCYTES RELATIVE PERCENT: 20.4 %
MCH RBC QN AUTO: 30.9 PG (ref 26–34)
MCHC RBC AUTO-ENTMCNC: 33.3 G/DL (ref 31–36)
MCV RBC AUTO: 92.8 FL (ref 80–100)
MONOCYTES ABSOLUTE: 0.9 K/UL (ref 0–1.3)
MONOCYTES RELATIVE PERCENT: 10.9 %
NEUTROPHILS ABSOLUTE: 5.8 K/UL (ref 1.7–7.7)
NEUTROPHILS RELATIVE PERCENT: 65.8 %
PDW BLD-RTO: 13.9 % (ref 12.4–15.4)
PLATELET # BLD: 230 K/UL (ref 135–450)
PMV BLD AUTO: 9.2 FL (ref 5–10.5)
POTASSIUM REFLEX MAGNESIUM: 4.2 MMOL/L (ref 3.5–5.1)
RBC # BLD: 3.82 M/UL (ref 4–5.2)
SODIUM BLD-SCNC: 138 MMOL/L (ref 136–145)
WBC # BLD: 8.7 K/UL (ref 4–11)

## 2020-07-05 PROCEDURE — G0378 HOSPITAL OBSERVATION PER HR: HCPCS

## 2020-07-05 PROCEDURE — 97530 THERAPEUTIC ACTIVITIES: CPT

## 2020-07-05 PROCEDURE — 97161 PT EVAL LOW COMPLEX 20 MIN: CPT

## 2020-07-05 PROCEDURE — 6360000002 HC RX W HCPCS: Performed by: ORTHOPAEDIC SURGERY

## 2020-07-05 PROCEDURE — 36415 COLL VENOUS BLD VENIPUNCTURE: CPT

## 2020-07-05 PROCEDURE — 2580000003 HC RX 258: Performed by: ORTHOPAEDIC SURGERY

## 2020-07-05 PROCEDURE — 85025 COMPLETE CBC W/AUTO DIFF WBC: CPT

## 2020-07-05 PROCEDURE — 94760 N-INVAS EAR/PLS OXIMETRY 1: CPT

## 2020-07-05 PROCEDURE — 80048 BASIC METABOLIC PNL TOTAL CA: CPT

## 2020-07-05 PROCEDURE — 6370000000 HC RX 637 (ALT 250 FOR IP): Performed by: ORTHOPAEDIC SURGERY

## 2020-07-05 RX ORDER — IBUPROFEN 400 MG/1
400 TABLET ORAL EVERY 6 HOURS PRN
Qty: 60 TABLET | Refills: 0 | Status: SHIPPED | OUTPATIENT
Start: 2020-07-05

## 2020-07-05 RX ORDER — ACETAMINOPHEN 500 MG
500 TABLET ORAL EVERY 6 HOURS PRN
Qty: 120 TABLET | Refills: 0 | Status: SHIPPED | OUTPATIENT
Start: 2020-07-05

## 2020-07-05 RX ADMIN — ASPIRIN 325 MG: 325 TABLET, DELAYED RELEASE ORAL at 08:15

## 2020-07-05 RX ADMIN — SODIUM CHLORIDE, PRESERVATIVE FREE 10 ML: 5 INJECTION INTRAVENOUS at 08:15

## 2020-07-05 RX ADMIN — CEFAZOLIN 2 G: 10 INJECTION, POWDER, FOR SOLUTION INTRAVENOUS at 11:00

## 2020-07-05 RX ADMIN — ACETAMINOPHEN 1000 MG: 500 TABLET ORAL at 05:28

## 2020-07-05 RX ADMIN — DOCUSATE SODIUM AND SENNOSIDES 1 TABLET: 8.6; 5 TABLET, FILM COATED ORAL at 08:15

## 2020-07-05 RX ADMIN — CEFAZOLIN 2 G: 10 INJECTION, POWDER, FOR SOLUTION INTRAVENOUS at 05:28

## 2020-07-05 ASSESSMENT — PAIN SCALES - GENERAL: PAINLEVEL_OUTOF10: 0

## 2020-07-05 NOTE — PLAN OF CARE
Problem: Falls - Risk of:  Goal: Will remain free from falls  Description: Will remain free from falls  7/4/2020 2356 by Jose Martin RN  Outcome: Ongoing  7/4/2020 1702 by Jasmyne Quintanilla RN  Outcome: Ongoing  Note: Fall risk assessment completed. Fall precautions in place. Call light within reach. Pt educated on calling for assistance before getting up. Walkway free of clutter. Will continue to monitor. Goal: Absence of physical injury  Description: Absence of physical injury  7/4/2020 2356 by Jose Martin RN  Outcome: Ongoing  7/4/2020 1702 by Jasmyne Quintanilla RN  Outcome: Ongoing  Note: Pt is free of injury. No injury noted. Fall precautions in place. Call light within reach. Will monitor. Problem: Infection - Surgical Site:  Goal: Will show no infection signs and symptoms  Description: Will show no infection signs and symptoms  7/4/2020 2356 by Jose Martin RN  Outcome: Ongoing  7/4/2020 1702 by Jasmyne Quintanilla RN  Outcome: Ongoing  Note: Pt shows no signs or symptoms of infection. Will monitor patient, labs and vitals. Problem: Pain:  Goal: Pain level will decrease  Description: Pain level will decrease  7/4/2020 2356 by Jose Martin RN  Outcome: Ongoing  7/4/2020 1702 by Jasmyne Quintanilla RN  Outcome: Ongoing  Note: Pt assessed for pain. Pt denies any pain at this time. Will continue to monitor pt and assess for pain throughout rest of shift. Goal: Control of acute pain  Description: Control of acute pain  7/4/2020 2356 by Jose Martin RN  Outcome: Ongoing  7/4/2020 1702 by Jamsyne Quintanilla RN  Outcome: Ongoing  Note: Pt assessed for pain. Pt denies any pain at this time. Will continue to monitor pt and assess for pain throughout rest of shift.

## 2020-07-05 NOTE — PROGRESS NOTES
Patient discharged with belongings with family via wheelchair via [address] transportation to private residence. Patient given discharge instructions, patient verbalized understanding, no questions at this time. Prescriptions given to patient. IV D/Cd patient tolerated well, no complications.        Electronically signed by Tino Hines RN on 7/5/2020 at 2:26 PM

## 2020-07-05 NOTE — PLAN OF CARE
Problem: Falls - Risk of:  Goal: Will remain free from falls  Description: Will remain free from falls  7/5/2020 0711 by Kristy Cranker, RN  Outcome: Ongoing  Note: Fall risk assessment completed . Fall precautions in place, bed alarm on, side rails 2/4 up, call light in reach, educated pt on calling for assistance when needed, room clear of clutter. Pt verbalized understanding. 7/4/2020 2356 by Ajay Snyder RN  Outcome: Ongoing  Goal: Absence of physical injury  Description: Absence of physical injury  7/5/2020 0711 by Kristy Cranker, RN  Outcome: Ongoing  7/4/2020 2356 by Ajay Snyder RN  Outcome: Ongoing     Problem: Infection - Surgical Site:  Goal: Will show no infection signs and symptoms  Description: Will show no infection signs and symptoms  7/5/2020 0711 by Kristy Cranker, RN  Outcome: Ongoing  Note: Patient is alert and oriented, afebrile, has manageable complaints of pain, skin is intact and appropriate for ethnicity in color    7/4/2020 2356 by Ajay Snyder RN  Outcome: Ongoing     Problem: Pain:  Goal: Pain level will decrease  Description: Pain level will decrease  7/5/2020 0711 by Kristy Cranker, RN  Outcome: Ongoing  Note: Pain /discomfort being managed with PRN analgesics per MD orders. Patient able to express presence and absence of pain and rate pain appropriately using numerical scale.      7/4/2020 2356 by Ajay Snyder RN  Outcome: Ongoing  Goal: Control of acute pain  Description: Control of acute pain  7/5/2020 0711 by Kristy Cranker, RN  Outcome: Ongoing  7/4/2020 2356 by Ajay Snyder RN  Outcome: Ongoing

## 2020-07-05 NOTE — PROGRESS NOTES
[address] [facility] Orthopedic Surgery  Progress Note        POD # 1, s/p I&D and ORIF left open distal radius fracture. Pt comfortable, no c/o. Splint left wrist D/C/I,  No pain with left fingers ROM, NVI    CBC:   Lab Results   Component Value Date    WBC 8.7 07/05/2020    RBC 3.82 07/05/2020    HGB 11.8 07/05/2020    HCT 35.4 07/05/2020    MCV 92.8 07/05/2020    MCH 30.9 07/05/2020    MCHC 33.3 07/05/2020    RDW 13.9 07/05/2020     07/05/2020    MPV 9.2 07/05/2020     PT/INR:    Lab Results   Component Value Date    PROTIME 10.5 08/14/2015    INR 0.97 08/14/2015     PTT:  No results found for: APTT[APTT    A/P: s/p I&D and ORIF left open distal radius fracture. - Stable  - PT/OT, NWB left wrist  - Ok to D/C home today after another dose of Ancef from ortho standpoint.  - keep splint dry and clean. - F/U Dr Ede Bartholomew in 2 weeks.       Carmelita Andre MD 7/5/2020 10:07 AM

## 2020-07-05 NOTE — PROGRESS NOTES
Checking on patient Q2H for nutrition needs, hygiene needs, comfort measures, mobility, fall risk interventions, and safe environment. All precautions and interventions in place. Educated patient on use of call light and telephone. Patient verbalizes understanding. Call light/telephone in reach.   Electronically signed by Caren Sam RN on 7/5/2020 at 7:13 AM

## 2020-07-05 NOTE — ANESTHESIA POSTPROCEDURE EVALUATION
Department of Anesthesiology  Postprocedure Note    Patient: Danielle Ruano  MRN: [id number]  YOB: 1940  Date of evaluation: 7/5/2020  Time:  8:55 AM     Procedure Summary     Date:  07/04/20 Room / Location:  [address]    Anesthesia Start:  0501 Anesthesia Stop:  9795    Procedures:       OPEN REDUCTION INTERNAL FIXATION distal radius open fracture (Left )      INCISION AND DRAINAGE left wrist (Left ) Diagnosis:  (left distal radius fracture)    Surgeon:  Leeanna Gosselin, MD Responsible Provider:  Boni Escobar MD    Anesthesia Type:  general ASA Status:  2 - Emergent          Anesthesia Type: general    Faiza Phase I: Faiza Score: 9    Faiza Phase II:      Last vitals: Reviewed and per EMR flowsheets.        Anesthesia Post Evaluation    Patient location during evaluation: PACU  Level of consciousness: awake and alert  Airway patency: patent  Nausea & Vomiting: no nausea and no vomiting  Complications: no  Cardiovascular status: blood pressure returned to baseline  Respiratory status: acceptable  Hydration status: euvolemic  Comments: Postoperative Anesthesia Note    Name:    Danielle Ruano  MRN:      [id number]    Patient Vitals in the past 12 hrs:  07/05/20 0730, BP:137/70, Temp:98.7 °F (37.1 °C), Temp src:Oral, Pulse:81, Resp:16, SpO2:97 %  07/05/20 0548, Weight:128 lb 4.9 oz (58.2 kg)  07/05/20 0318, BP:139/75, Temp:99.1 °F (37.3 °C), Temp src:Oral, Pulse:99, Resp:16, SpO2:96 %  07/04/20 2343, BP:130/75, Temp:97.6 °F (36.4 °C), Temp src:Oral, Pulse:89, Resp:16, SpO2:96 %     LABS:    CBC  Lab Results       Component                Value               Date/Time                  WBC                      8.7                 07/05/2020 05:06 AM        HGB                      11.8 (L)            07/05/2020 05:06 AM        HCT                      35.4 (L)            07/05/2020 05:06 AM        PLT                      230                 07/05/2020 05:06 AM RENAL  Lab Results       Component                Value               Date/Time                  NA                       138                 07/05/2020 05:06 AM        K                        4.2                 07/05/2020 05:06 AM        CL                       104                 07/05/2020 05:06 AM        CO2                      22                  07/05/2020 05:06 AM        BUN                      18                  07/05/2020 05:06 AM        CREATININE               0.9                 07/05/2020 05:06 AM        GLUCOSE                  104 (H)             07/05/2020 05:06 AM   COAGS  Lab Results       Component                Value               Date/Time                  PROTIME                  10.5                08/14/2015 07:40 AM        INR                      0.97                08/14/2015 07:40 AM     Intake & Output:  @87IKKZ@    Nausea & Vomiting:  No    Level of Consciousness:  Awake    Pain Assessment:  Adequate analgesia    Anesthesia Complications:  No apparent anesthetic complications    SUMMARY      Vital signs stable  OK to discharge from Stage I post anesthesia care.   Care transferred from Anesthesiology department on discharge from perioperative area

## 2020-07-05 NOTE — DISCHARGE SUMMARY
Hospital Medicine Discharge Summary    Patient ID: Xiao Marquez      Patient's PCP: Sisi GUTIERREZ    Admit Date: 7/4/2020     Discharge Date:   7/5/20    Admitting Physician: Carol Knutson MD     Discharge Physician: Carol Knutson MD     Discharge Diagnoses: Active Hospital Problems    Diagnosis Date Noted    Left wrist fracture, open, initial encounter [S62.102B] 07/04/2020    Open fracture of left distal radius [S52.502B]        The patient was seen and examined on day of discharge and this discharge summary is in conjunction with any daily progress note from day of discharge. Hospital Course: The patient is a 78 y.o. female who presents to [facility] with fall and left wrist pain. Patient was working outside when she was walking between 2 cement pillars when she lost her footing on a graded hill and fell on an outstretched hand. She immediately had pain and her grandson brought her to the emergency department. X-ray of the wrist shows a acute severely comminuted and displaced distal radial fracture along with fracture of the ulnar styloid. Orthopedic surgery was consulted and they will take her to the OR this afternoon for an open reduction and internal fixation.     Patient denies any fever, chills, nausea, vomiting, diarrhea, constipation, chest pain or shortness of breath. She was tested for COVID-19 preoperatively.  COVID NEGATIVE    Left open wrist fracture  OR with OPEN REDUCTION INTERNAL FIXATION distal radius open fracture  INCISION AND DRAINAGE left wrist 7/4  Oral and IV pain medication: DC with tylenol and ibuprofen  Scheduled Tylenol  Physical and Occupational Therapy: no need for furtehr PT/OT     Leukocytosis  Likely reactionary  Recheck in the morning, resolved     Fall, mechanical  No loss of consciousness or hitting of head  Physical and Occupational Therapy     Exam:     /70   Pulse 84   Temp 98.7 °F (37.1 °C) (Oral)   Resp 16   Wt 128 lb 4.9 oz (58.2 kg)   SpO2 97%   BMI 24.24 kg/m²     General appearance: No apparent distress, appears stated age and cooperative. HEENT: Pupils equal, round, and reactive to light. Conjunctivae/corneas clear. Neck: Supple, with full range of motion. No jugular venous distention. Trachea midline. Respiratory:  Normal respiratory effort. Clear to auscultation, bilaterally without Rales/Wheezes/Rhonchi. Cardiovascular: Regular rate and rhythm with normal S1/S2 without murmurs, rubs or gallops. Abdomen: Soft, non-tender, non-distended with normal bowel sounds. Musculoskeletal: L wrist surgically wrapped, soft cast  Skin: Skin color, texture, turgor normal.  No rashes or lesions. Neurologic:  Neurovascularly intact without any focal sensory/motor deficits. Cranial nerves: II-XII intact, grossly non-focal.  Psychiatric: Alert and oriented, thought content appropriate, normal insight      Consults:     IP CONSULT TO SOCIAL WORK    Significant Diagnostic Studies:     XR WRIST LEFT (2 VIEWS)   Final Result      FLUORO FOR SURGICAL PROCEDURES   Final Result      CT HEAD WO CONTRAST   Final Result   No acute intracranial abnormality. CT ORBITS WO CONTRAST   Final Result   No evidence for acute orbital fracture. XR WRIST LEFT (MIN 3 VIEWS)   Final Result   Acute severely comminuted and displaced distal radial fracture with   foreshortening. Fracture of the ulnar styloid. Soft tissue gas present   consistent with laceration and open or compound fracture. Osteoporosis. Disposition:  Home     Condition at Discharge: Stable    Discharge Instructions/Follow-up:  PCP, Ortho in 2 weeks    Code Status:  Full Code     Activity: activity as tolerated    Diet: regular diet      Labs:  For convenience and continuity at follow-up the following most recent labs are provided:      CBC:    Lab Results   Component Value Date    WBC 8.7 07/05/2020    HGB 11.8 07/05/2020    HCT 35.4 07/05/2020     07/05/2020 Renal:    Lab Results   Component Value Date     07/05/2020    K 4.2 07/05/2020     07/05/2020    CO2 22 07/05/2020    BUN 18 07/05/2020    CREATININE 0.9 07/05/2020    CALCIUM 8.3 07/05/2020    PHOS 2.5 02/14/2010       Discharge Medications:     Current Discharge Medication List           Details   acetaminophen (TYLENOL) 500 MG tablet Take 1 tablet by mouth every 6 hours as needed for Pain  Qty: 120 tablet, Refills: 0      ibuprofen (ADVIL;MOTRIN) 400 MG tablet Take 1 tablet by mouth every 6 hours as needed for Pain  Qty: 60 tablet, Refills: 0              Details   aspirin 325 MG EC tablet Take 1 tablet by mouth daily Last dose 9/8/15  Qty: 30 tablet, Refills: 0      Multiple Vitamins-Minerals (THERAPEUTIC MULTIVITAMIN-MINERALS) tablet Take 1 tablet by mouth daily      NONFORMULARY Bone up             No future appointments. Time Spent on discharge is more than 30 minutes in the examination, evaluation, counseling and review of medications and discharge plan. Signed:    Stephanie Bone MD   7/5/2020      Thank you Eun Toro for the opportunity to be involved in this patient's care. If you have any questions or concerns please feel free to contact me at [phone].

## 2020-07-05 NOTE — PROGRESS NOTES
Patient's resting with eyes closed. Respirations with normal limits. Bed alarm on. Cast on left wrist in place . Iv fluids infusing as ordered.  Electronically signed by Devorah Miguel RN on 7/5/2020 at 1:27 AM

## 2020-07-05 NOTE — OP NOTE
[address] [facility]                                OPERATIVE REPORT    PATIENT NAME: Kenney Wells                     :        1940  MED REC NO:   [id number]                          ROOM:       3101  ACCOUNT NO:   [de-identified]                           ADMIT DATE: 2020  PROVIDER:     Rigo Hernandez MD    DATE OF PROCEDURE:  2020    PREOPERATIVE DIAGNOSIS:  Left distal radius grade 2 open intraarticular  three-part fracture. POSTOPERATIVE DIAGNOSIS:  Left distal radius grade 2 open intraarticular  three-part fracture. OPERATION PERFORMED:  1. Open treatment of left distal radius three-part intraarticular  fracture with open reduction and internal fixation. 2.  Debridement of open fracture, including skin, subcutaneous tissues,  fascia, and bone. SURGEON:  Rigo Hernandez MD    ASSISTANT:  Pierre Wise Surgical Assistant    PRIMARY CARE PHYSICIAN:  Gema Brown MD    ANESTHESIA:  General anesthesia. ESTIMATED BLOOD LOSS:  Minimal.    COMPLICATIONS:  None. TOURNIQUET:  Left upper arm, 250 mmHg. INDICATIONS:  This is an 69-year-old white female, well known to me in  the past when she had a gamma nail, left side in . She sustained a  fall with a left wrist injury. She presented with deformity and open  wound in the volar aspect with at least 1 to 2 open fracture. All  risks, benefits, and alternatives were discussed with the patient, and  she agreed to proceed with the surgical treatment. OPERATIVE PROCEDURE:  The patient's left wrist was marked. She received  2 gm Ancef IV in the ER. The patient was then brought to the operating  room and underwent general anesthesia. A well-padded tourniquet was  placed in the left upper arm. The left upper extremity was then prepped  and draped in a regular sterile routine fashion.   A timeout was called  confirming the patient's name, site, and procedure. Esmarch was used for exsanguination, and tourniquet was inflated to 250  mmHg. We first started with the debridement part. We sharply used a 15  blade to excise the skin, subcutaneous tissues, and fascia down to the  bone which was more in the volar midline area. At this point, we  sharply excised all unviable soft tissues. We then irrigated the wound  copiously with normal saline mixed with gentamicin. At this point, we turned attention towards the open reduction and  internal fixation. An incision was made over the FCR tendon. The  tendon sheath was opened. We then found the pronator quadratus fracture  traumatized by the fracture. We then incised the remaining part with  the cautery. The fracture was found to be mostly comminuted, at least  three-part intraarticular displaced fracture. We were carefully able to  reduce the fracture anatomically. We used a styloid process K-wire for  provisional fixation. Overall, it was significantly challenging given  the osteoporotic nature of the patient's bone as well as the instability  of the fracture. We carefully put the plate which was [place] titanium  intermediate volar locking plate in appropriate position. We put one  screw in the oblong hole, and then we fine-tuned the position of the  plate. We then reduced the fracture to the plate and locked it with a  total of seven distal 2.7 locking screws. We added one more locking  screw in the shaft. Overall, we were very satisfied with the anatomic reduction and position  of all the screws. At this point, we let the tourniquet down, and  hemostasis was secured. We irrigated the wound copiously with normal  saline mixed with gentamicin. We then closed the subcutaneous with 3-0  Vicryl and then the skin with 4-0 Monocryl. The traumatic wound was  closed with 4-0 nylon. A dressing was applied with Xeroform, 4 x 4,  sterile Webril, and a volar splint was applied.     The patient tolerated the procedure well and was taken to recovery in a  stable condition. POSTOPERATIVE PLAN:  The patient will be readmitted as an inpatient. We  will start PT/OT with nonweightbearing on the left upper extremity for  at least six weeks. Given the nature of the patient's bone and  osteoporosis, we will be very careful postoperatively with protective  brace for at least four to six weeks.         Oscar Zacarias MD    D: 07/04/2020 16:15:45       T: 07/04/2020 23:21:13     /[email or web address]  Job#: [id number]     Doc#: [id number]    CC:  Chelsy Ward

## 2020-07-05 NOTE — PROGRESS NOTES
Physical Therapy    Facility/Department: [address] ORTHOPEDICS  Initial Assessment    NAME: Winsome Messer  : 1940  MRN: [id number]    Date of Service: 2020    Discharge Recommendations:  Home with assist PRN     Winsome Messer scored a 24/24 on the AM-PAC short mobility form. At this time, no further PT is recommended upon discharge due to ready for discharge home. Recommend patient returns to prior setting with prior services. Assessment   Assessment: Pt is an 78 y.o. female who presented to the ED on 20 afer tripping of curb/step, sustaining left distal radius fx. Patient underwent ORIF per Dr. Mack Toure on 20 - NWB to left wrist. Patient functioning close to her baseline mobility. Anticipate she will be able to return home with increased prn assist from her grandson. Prognosis: Good  Decision Making: Low Complexity  History: see below  Exam: see below  Clinical Presentation: stable  PT Education: PT Role;Plan of Care  No Skilled PT: Safe to return home  REQUIRES PT FOLLOW UP: No  Activity Tolerance  Activity Tolerance: Patient Tolerated treatment well       Patient Diagnosis(es): The primary encounter diagnosis was Left wrist fracture, open, initial encounter. A diagnosis of Facial laceration, initial encounter was also pertinent to this visit. has a past medical history of Cataract and Colon cancer ([facility] [address] has a past surgical history that includes knee surgery; other surgical history (8/7/15); Forearm surgery (Left, 2020); and Hand surgery (Left, 2020).     Restrictions  Restrictions/Precautions  Restrictions/Precautions: Weight Bearing  Upper Extremity Weight Bearing Restrictions  Left Upper Extremity Weight Bearing: Non Weight Bearing  Other: to wrist     Vision/Hearing  Vision: Within Functional Limits  Hearing: Within functional limits       Subjective  General  Chart Reviewed: Yes  Patient assessed for rehabilitation services?: Yes  Additional Pertinent Hx: Pt is an 78 y.o. female who presented to the ED on 7/4/20 afer tripping of curb/step, sustaining left distal radius fx. Patient underwent ORIF per Dr. Juliette Hayes on 7/4/20 - NWB to left wrist.  Response To Previous Treatment: Not applicable  Family / Caregiver Present: No  Referring Practitioner: Dr. Juliette Hayes  Referral Date : 07/04/20  Diagnosis: left wrist fx  Follows Commands: Within Functional Limits  Subjective  Subjective: Pt is agreeable to Pt          Orientation  Orientation  Overall Orientation Status: Within Functional Limits     Social/Functional History  Social/Functional History  Lives With: Alone  Type of Home: House  Home Layout: One level, Laundry in basement  Home Access: Stairs to enter without rails  Entrance Stairs - Number of Steps: 1  Bathroom Shower/Tub: Tub/Shower unit  Bathroom Toilet: Standard  Home Equipment: Rolling walker, Cane  ADL Assistance: [address] [place] [address] Avenue: (nicolas does laundry and cleaning of home; (recently does grocery shopping due to [place]))  Ambulation Assistance: Independent  Transfer Assistance: Independent  Active : Yes  Mode of Transportation: Car  Occupation: Retired  Type of occupation: [employer]     Cognition   Cognition  Overall Cognitive Status: WFL    Objective  Observation/Palpation  Observation: arthritic varus deformity to left knee       Strength RLE  Strength RLE: WFL  Strength LLE  Strength LLE: WFL  Motor Control  Gross Motor?: WFL     Bed mobility  Supine to Sit: Unable to assess  Sit to Supine: Unable to assess  Comment: pt in recliner at beginning and end of session  Transfers  Sit to Stand: Supervision  Stand to sit: Supervision  Ambulation  Ambulation?: Yes  Ambulation 1  Surface: level tile  Device: No Device  Assistance: Supervision  Quality of Gait: antalgic due to severe arthritis to left knee (varus deformity). Gait Deviations: Slow Zeinab  Distance: 100'  Comments: pt with one instance of unsteadiness, but self controlled. Balance  Sitting - Static: Good  Sitting - Dynamic: Good  Standing - Static: Fair;+  Standing - Dynamic: Fair        Plan   Safety Devices  Type of devices: Call light within reach, Gait belt, Nurse notified    AM-PAC Score  AM-PAC Inpatient Mobility Raw Score : 24 (07/05/20 0905)  AM-PAC Inpatient T-Scale Score : 61.14 (07/05/20 0905)  Mobility Inpatient CMS 0-100% Score: 0 (07/05/20 0905)  Mobility Inpatient CMS G-Code Modifier : [address] (07/05/20 9199)          Therapy Time   Individual Concurrent Group Co-treatment   Time In 0730         Time Out 0810         Minutes 40         Timed Code Treatment Minutes: 25 Minutes       Jared Moreno PT

## 2020-07-17 ENCOUNTER — OFFICE VISIT (OUTPATIENT)
Dept: ORTHOPEDIC SURGERY | Age: 80
End: 2020-07-17
Payer: MEDICARE

## 2020-07-17 VITALS — TEMPERATURE: 97.3 F | WEIGHT: 128 LBS | BODY MASS INDEX: 24.19 KG/M2

## 2020-07-17 PROCEDURE — L3908 WHO COCK-UP NONMOLDE PRE OTS: HCPCS | Performed by: NURSE PRACTITIONER

## 2020-07-17 PROCEDURE — 99024 POSTOP FOLLOW-UP VISIT: CPT | Performed by: NURSE PRACTITIONER

## 2020-07-20 NOTE — PROGRESS NOTES
DIAGNOSIS:  Left distal radius Grade 2 OPEN 3 parts intra-articular displaced fracture, status post ORIF. DATE OF SURGERY:  7/4/2020. HISTORY OF PRESENT ILLNESS:  Ms. Mari Ureña 78 y.o.  female right handed returns today  for 2 weeks postoperative visit. The patient denies any significant pain in the left wrist. Rates pain a 0-1/10 VAS mild, aching, intermittent and are improving. Aggravating factors movement. Alleviating factors rest. No numbness or tingling sensation. No fever or Chills. She  is in a splint. She has completed antibiotics. PHYSICAL EXAMINATION:  The incision is completely healed . The open fracture wound is healing well, sutures removed today   No signs of any erythema or drainage. She  has no pain with the active or passive range of motion of the left wrist, but decrease ROM. She  has intact sensation, distally, and she  is neurovascularly intact. IMAGING:  Three views left wrist taken today in the office showed anatomic alignment of left distal radius, plate and screws in good position, no loosening. IMPRESSION:  2 weeks out from left  Distal radius ORIF and doing very well. PLAN:  I have told the patient to work on ROM, as well as strengthening exercises. A removable forearm brace applied. No heavy impact activities. The patient will come back for a follow up in 6 weeks. At that time, we will take 3 views of the left wrist. PT if needed then. As this patient has demonstrated risk factors for osteoporosis, such as age greater than [de-identified] years and evidence of a fracture, I have referred the patient back to the primary care physician for evaluation for osteoporosis, including consideration for DEXA scanning, if this is felt to be clinically indicated. The patient is advised to contact the primary care physician to follow-up for further evaluation.          Procedures    Azeb Mamaherb Titan Wrist Long Forearm Brace     Patient was prescribed a [employer]eb [employer] and Forearm Brace. The left wrist will require stabilization / immobilization from this semi-rigid / rigid orthosis to improve their function. The orthosis will assist in protecting the affected area, provide functional support and facilitate healing. The patient was educated and fit by a healthcare professional with expert knowledge and specialization in brace application while under the direct supervision of the treating physician. Verbal and written instructions for the use of and application of this item were provided. They were instructed to contact the office immediately should the brace result in increased pain, decreased sensation, increased swelling or worsening of the condition.        Portia Osborne, APRN - CNP

## 2020-08-28 ENCOUNTER — OFFICE VISIT (OUTPATIENT)
Dept: ORTHOPEDIC SURGERY | Age: 80
End: 2020-08-28

## 2020-08-28 VITALS — TEMPERATURE: 98.1 F | HEIGHT: 61 IN | WEIGHT: 128 LBS | BODY MASS INDEX: 24.17 KG/M2

## 2020-08-28 PROCEDURE — 99024 POSTOP FOLLOW-UP VISIT: CPT | Performed by: ORTHOPAEDIC SURGERY

## 2020-08-28 NOTE — PROGRESS NOTES
DIAGNOSIS:  Left distal radius Grade 2 OPEN 3 parts intra-articular displaced fracture, status post ORIF. DATE OF SURGERY:  7/4/2020. HISTORY OF PRESENT ILLNESS:  Ms. Mila Joshua 78 y.o.  female right handed returns today  for 2 months postoperative visit. The patient denies any significant pain in the left wrist. Rates pain a 0/10 VAS mild, aching, intermittent and are improving. No numbness or tingling sensation. No fever or Chills. She is in a brace. She has completed antibiotics. PHYSICAL EXAMINATION:  The incision is completely healed . The open fracture wound is healed well, sutures removed 7/17/2020. No signs of any erythema or drainage. She  has no pain with the active or passive range of motion of the left wrist, but minimal decrease ROM. She  has intact sensation, distally, and she  is neurovascularly intact. IMAGING:  Three views left wrist taken today in the office showed anatomic alignment of left distal radius, plate and screws in good position, no loosening. IMPRESSION:  2 months out from left  Distal radius ORIF and doing very well. PLAN:  I have told the patient to work on agressive ROM, as well as strengthening exercises. D/C removable forearm brace. No heavy impact activities. The patient will come back for a follow up in 6 weeks. At that time, we will take 3 views of the left wrist. PT if needed then. As this patient has demonstrated risk factors for osteoporosis, such as age greater than [de-identified] years and evidence of a fracture, I have referred the patient back to the primary care physician for evaluation for osteoporosis, including consideration for DEXA scanning, if this is felt to be clinically indicated. The patient is advised to contact the primary care physician to follow-up for further evaluation.        Shannon Leon MD

## 2020-10-23 ENCOUNTER — OFFICE VISIT (OUTPATIENT)
Dept: ORTHOPEDIC SURGERY | Age: 80
End: 2020-10-23
Payer: MEDICARE

## 2020-10-23 VITALS — HEIGHT: 61 IN | BODY MASS INDEX: 24.17 KG/M2 | WEIGHT: 128 LBS | TEMPERATURE: 98.1 F

## 2020-10-23 PROCEDURE — 4040F PNEUMOC VAC/ADMIN/RCVD: CPT | Performed by: ORTHOPAEDIC SURGERY

## 2020-10-23 PROCEDURE — G8420 CALC BMI NORM PARAMETERS: HCPCS | Performed by: ORTHOPAEDIC SURGERY

## 2020-10-23 PROCEDURE — G8400 PT W/DXA NO RESULTS DOC: HCPCS | Performed by: ORTHOPAEDIC SURGERY

## 2020-10-23 PROCEDURE — G8484 FLU IMMUNIZE NO ADMIN: HCPCS | Performed by: ORTHOPAEDIC SURGERY

## 2020-10-23 PROCEDURE — 1090F PRES/ABSN URINE INCON ASSESS: CPT | Performed by: ORTHOPAEDIC SURGERY

## 2020-10-23 PROCEDURE — G8427 DOCREV CUR MEDS BY ELIG CLIN: HCPCS | Performed by: ORTHOPAEDIC SURGERY

## 2020-10-23 PROCEDURE — 4004F PT TOBACCO SCREEN RCVD TLK: CPT | Performed by: ORTHOPAEDIC SURGERY

## 2020-10-23 PROCEDURE — 1123F ACP DISCUSS/DSCN MKR DOCD: CPT | Performed by: ORTHOPAEDIC SURGERY

## 2020-10-23 PROCEDURE — 99213 OFFICE O/P EST LOW 20 MIN: CPT | Performed by: ORTHOPAEDIC SURGERY

## 2020-10-23 NOTE — PROGRESS NOTES
Sexual Activity    Alcohol use: No     Alcohol/week: 0.0 standard drinks    Drug use: No    Sexual activity: Not on file   Lifestyle    Physical activity     Days per week: Not on file     Minutes per session: Not on file    Stress: Not on file   Relationships    Social connections     Talks on phone: Not on file     Gets together: Not on file     Attends [employer]oration[employer] service: Not on file     Active member of club or organization: Not on file     Attends meetings of clubs or organizations: Not on file     Relationship status: Not on file    Intimate partner violence     Fear of current or ex partner: Not on file     Emotionally abused: Not on file     Physically abused: Not on file     Forced sexual activity: Not on file   Other Topics Concern    Not on file   Social History Narrative    Not on file       History reviewed. No pertinent family history. Current Outpatient Medications on File Prior to Visit   Medication Sig Dispense Refill    acetaminophen (TYLENOL) 500 MG tablet Take 1 tablet by mouth every 6 hours as needed for Pain 120 tablet 0    ibuprofen (ADVIL;MOTRIN) 400 MG tablet Take 1 tablet by mouth every 6 hours as needed for Pain 60 tablet 0    aspirin 325 MG EC tablet Take 1 tablet by mouth daily Last dose 9/8/15 30 tablet 0    NONFORMULARY Bone up      Multiple Vitamins-Minerals (THERAPEUTIC MULTIVITAMIN-MINERALS) tablet Take 1 tablet by mouth daily       No current facility-administered medications on file prior to visit. Pertinent items are noted in HPI  Review of systems reviewed from Patient History Form dated on 7/17/2020 and available in the patient's chart under the Media tab. No change noted. PHYSICAL EXAMINATION:  Ms. Celia Hutton is a very pleasant 78 y.o.  female who presents today in no acute distress, awake, alert, and oriented. She is well dressed, nourished and  groomed. Patient with normal affect.   Height is  5' 1\" (1.549 m), weight is 128 lb (58.1 kg), Body mass index is 24.19 kg/m². Resting respiratory rate is 16. The patient walks with slow gait using a cane. The incision is completely healed . The open fracture wound is healed well, sutures removed 7/17/2020. No signs of any erythema or drainage. She  has no pain with the active or passive range of motion of the left wrist, full ROM. She  has intact sensation, distally, and she  is neurovascularly intact. Good strength, and no instability both upper and lower extremities. IMAGING:  Three views left wrist taken today in the office showed anatomic alignment of left distal radius, plate and screws in good position, no loosening. No new fracture seen. IMPRESSION:    1-3 months out from left  Distal radius ORIF and doing very well. 2-Left wrist pain/contusion, multiple new falls    PLAN:  I discussed with the patient that no new fracture was seen. I discussed with the patient to work on agressive ROM, as well as strengthening exercises. She can go back to normal activity with no restrictions. She will be seen PRN. I told the patient that it is not unusual to have some achy pain and swelling for up to a year after a fracture. As this patient has demonstrated risk factors for osteoporosis, such as age greater than [de-identified] years and evidence of a fracture, I have referred the patient back to the primary care physician for evaluation for osteoporosis, including consideration for DEXA scanning, if this is felt to be clinically indicated. The patient is advised to contact the primary care physician to follow-up for further evaluation.        Abel Fisher MD

## 2024-08-10 ENCOUNTER — HOSPITAL ENCOUNTER (EMERGENCY)
Age: 84
Discharge: HOME | End: 2024-08-10
Payer: MEDICARE

## 2024-08-10 ENCOUNTER — APPOINTMENT (OUTPATIENT)
Dept: CT IMAGING | Age: 84
End: 2024-08-10
Payer: MEDICARE

## 2024-08-10 VITALS
WEIGHT: 127.43 LBS | BODY MASS INDEX: 24.08 KG/M2 | SYSTOLIC BLOOD PRESSURE: 163 MMHG | RESPIRATION RATE: 16 BRPM | DIASTOLIC BLOOD PRESSURE: 80 MMHG | OXYGEN SATURATION: 98 % | TEMPERATURE: 97.9 F | HEART RATE: 68 BPM

## 2024-08-10 DIAGNOSIS — S32.040A CLOSED COMPRESSION FRACTURE OF L4 LUMBAR VERTEBRA, INITIAL ENCOUNTER (HCC): Primary | ICD-10-CM

## 2024-08-10 PROCEDURE — 6370000000 HC RX 637 (ALT 250 FOR IP): Performed by: PHYSICIAN ASSISTANT

## 2024-08-10 PROCEDURE — 70450 CT HEAD/BRAIN W/O DYE: CPT

## 2024-08-10 PROCEDURE — 99284 EMERGENCY DEPT VISIT MOD MDM: CPT

## 2024-08-10 PROCEDURE — 72131 CT LUMBAR SPINE W/O DYE: CPT

## 2024-08-10 RX ORDER — LIDOCAINE 4 G/G
1 PATCH TOPICAL DAILY
Status: DISCONTINUED | OUTPATIENT
Start: 2024-08-10 | End: 2024-08-10 | Stop reason: HOSPADM

## 2024-08-10 RX ORDER — LIDOCAINE 4 G/G
1 PATCH TOPICAL DAILY
Qty: 30 PATCH | Refills: 0 | Status: SHIPPED | OUTPATIENT
Start: 2024-08-10 | End: 2024-09-09

## 2024-08-10 ASSESSMENT — PAIN SCALES - GENERAL: PAINLEVEL_OUTOF10: 5

## 2024-08-10 ASSESSMENT — PAIN DESCRIPTION - LOCATION: LOCATION: BACK

## 2024-08-10 ASSESSMENT — PAIN DESCRIPTION - ORIENTATION: ORIENTATION: LOWER

## 2024-08-10 ASSESSMENT — PAIN DESCRIPTION - PAIN TYPE: TYPE: ACUTE PAIN

## 2024-08-10 ASSESSMENT — PAIN DESCRIPTION - DESCRIPTORS: DESCRIPTORS: ACHING

## 2024-08-10 ASSESSMENT — PAIN - FUNCTIONAL ASSESSMENT: PAIN_FUNCTIONAL_ASSESSMENT: ACTIVITIES ARE NOT PREVENTED

## 2024-08-10 NOTE — DISCHARGE INSTRUCTIONS
BACK PAIN        Back pain may appear after a sudden twisting/bending force (such as in a car accident), or sometimes after a simple awkward movement.  Back pain is usually caused by a STRAIN of the  muscles or stretching (SPRAIN) of the ligaments of the spine.  Sometimes an underlying problem with the spinal disks (small cushions between each bone in the spine) may cause pain by putting pressure on a nearby nerve.  In either case, muscle spasm is often present and adds to the pain.    Home Care:    1. Rest and relax the muscles.  Find your position of comfort.  Try lying flat on your back on a firm surface with pillows under your knees.  Or, try lying on your side with your knees drawn up towards your chest and a pillow between your knees.  (You may need to place a piece of plywood under your mattress if your bed is too soft.)  For severe back pain, you may need to stay in bed for 24 hours.    2. For less severe back pain, strict bed rest is not necessary; however don’t do anything that worsens the pain.  Avoid prolonged sitting or lifting anything over 15 pounds until the pain is gone.  Practice safe lifting and bending habits.     3. For the first 2 days after injury, inflammation will continue to build in the area.  Apply ice packs wrapped in a thin towel to the injured area for 20-30minuts every hour for the first 2 days while awake to limit inflammation.  After 2 days, heat (hot shower, hot bath or heating pad) may be applied to help reduce muscle spasm.   Muscle massage may be beneficial after 2 days.    4. Acetaminophen or ibuprofen, if you are not allergic to these products, may be taken every six hours for pain unless other pain medicine has been prescribed.  Be sure to take Advil (ibuprofen) with food.  If prescribed, narcotics (eg. Vicodin, Percocet) may make you drowsy.  Do not drive, drink alcohol or operate hazardous machinery while taking these medications.      Contact your doctor if you do not

## 2024-08-10 NOTE — ED PROVIDER NOTES
[facility] EMERGENCY DEPARTMENT  EMERGENCY DEPARTMENT ENCOUNTER        Pt Name: Jayne Torrez  MRN: [id number]  Birthdate 1940  Date of evaluation: 8/10/2024  Provider: ANGEL Gillis  PCP: Dung Sepulveda MD       I have seen and evaluated this patient with my supervising physician No att. providers found.      CHIEF COMPLAINT       Chief Complaint   Patient presents with    Fall     Patient presents from home with a fall down carpeted stairs on 7/31. States she is having pain in her lower back with ambulation. States she did hit her head, but denies loc. Denies blood thinner use. States she has been taking aleve for pain relief, but is not able to manage the pain at home. Denies pain with sitting, but states with ambulation it is 5/10       HISTORY OF PRESENT ILLNESS: 1 or more Elements     History from : Patient    Limitations to history : None    Jayne Torrez is a 84 y.o. female who presents via PV from home for back pain.   ED Course as of 08/10/24 1530   Sat Aug 10, 2024   1002 7/31 she fell down 7 basement steps she  was walking down the steps with laundry basket and she lost her footing, twisting and falling backwards hitting her low back on the ground first and then her head last. She denies LOC. She did not have emesis. She denies confusion. She was able to finish her laundry. She started to have bilateral buttock pain shortly thereafter, carlie has been using heat and ice. She has been able to sleep and sit on the couch ok but has pain with sitting in a firm chair. She has not had neck pain. She has had some mild left upper back pain. She notes the pain radiates to the outside thighs. She denies abd pain or chest pain, no difficulty breathing. She does not take blood thinners. She has been taking OTC pain relievers. She presents today because it has not gotten better.  [CS]                   Nursing Notes were all reviewed and agreed with or any disagreements were  discussions.  Jayne Torrez signed the refusal of treatment form and their nurse signed the form agreeing that the patient/guardian had received informed consent. After declining admission, I made every reasonable opportunity to treat Jayne Torrez  to the best of my ability.  I made sure the patient understood that they could return at any time if their condition worsened, developed additional concerns, just changed their mind, or for any reason at all at any time.  They understood this discussion and repeatedly requested discharge and declined hospitial admission.              I am the Primary Clinician of Record.    FINAL IMPRESSION      1. Closed compression fracture of L4 lumbar vertebra, initial encounter ([facility])          DISPOSITION/PLAN     DISPOSITION [place] 08/10/2024 01:42:34 PM      PATIENT REFERRED TO:  Ari Mccabe MD  [address] [facility] [address]  [place] [address]  [phone]    Call       Air Mccabe MD  [address]  [place] [address]  [phone]            DISCHARGE MEDICATIONS:  Discharge Medication List as of 8/10/2024  1:42 PM        START taking these medications    Details   lidocaine 4 % external patch Place 1 patch onto the skin daily, TransDERmal, DAILY Starting Sat 8/10/2024, Until Mon 9/9/2024, For 30 days, Disp-30 patch, R-0, Normal             DISCONTINUED MEDICATIONS:  Discharge Medication List as of 8/10/2024  1:42 PM                 (Please note that portions of this note were completed with a voice recognition program.  Efforts were made to edit the dictations but occasionally words are mis-transcribed.)    ANGEL Gillis (electronically signed)           Kaitlynn Shaikh PA  08/10/24 [id number]

## 2024-08-10 NOTE — ED NOTES
D/C: Order noted for d/c. Pt confirmed d/c paperwork has correct name. Discharge and education instructions reviewed with patient. Teach-back successful.  Pt verbalized understanding and denied questions at this time. No acute distress noted. Patient instructed to follow-up as noted - return to emergency department if symptoms worsen. Patient verbalized understanding. Discharged per EDMD with discharge instructions. Pt discharged to private vehicle. Patient stable upon departure. Thanked patient for choosing [facility] for care. Provider aware of patient pain at time of discharge.      Pt signed AMA paperwork. RN and ED PA in room at this time. ED Pa thoroughly went over risks of pt leaving AMA and neurosurgerys recommendation to be admitted to the ED and to not be walking. Pt stated with RN and Pa present that she does not want to stay in the hospital and that she does not want a wheelchair as ed PA recommended. Pt refusing home order for wheelchair as well. Pt verbally voiced to both ed PA and RN that she understands her risks by leaving ama and refusing care.

## (undated) DEVICE — GLOVE SURG SZ 6 CRM LTX FREE POLYISOPRENE POLYMER BEAD ANTI

## (undated) DEVICE — SYRINGE IRRIG 60ML SFT PLIABLE BLB EZ TO GRP 1 HND USE W/

## (undated) DEVICE — GLOVE SURG SZ 8 CRM LTX FREE POLYISOPRENE POLYMER BEAD ANTI

## (undated) DEVICE — GLOVE SURG SZ 6 L12IN FNGR THK79MIL GRN LTX FREE

## (undated) DEVICE — MINOR SET UP PK

## (undated) DEVICE — 3M™ STERI-STRIP™ COMPOUND BENZOIN TINCTURE 40 BAGS/CARTON 4 CARTONS/CASE C1544: Brand: 3M™ STERI-STRIP™

## (undated) DEVICE — PADDING UNDERCAST W4INXL4YD 100% COT CRIMPED FINISH WBRL II

## (undated) DEVICE — Z DISCONTINUED USE 2275686 GLOVE SURG SZ 8 L12IN FNGR THK13MIL WHT ISOLEX POLYISOPRENE

## (undated) DEVICE — ELECTRODE PT RET AD L9FT HI MOIST COND ADH HYDRGEL CORDED

## (undated) DEVICE — MERCY HEALTH WEST TURNOVER: Brand: MEDLINE INDUSTRIES, INC.

## (undated) DEVICE — COVER LT HNDL BLU PLAS

## (undated) DEVICE — GOWN SIRUS NONREIN LG W/TWL: Brand: MEDLINE INDUSTRIES, INC.

## (undated) DEVICE — Z DISCONTINUED PER MEDLINE (LOW STOCK)  USE 2422770 DRAPE C ARM W54XL78IN FOR FLROSCN

## (undated) DEVICE — CANISTER, RIGID, 1200CC: Brand: MEDLINE INDUSTRIES, INC.

## (undated) DEVICE — SUTURE VCRL SZ 3-0 L18IN ABSRB UD L26MM SH 1/2 CIR J864D

## (undated) DEVICE — DRESSING,GAUZE,XEROFORM,CURAD,1"X8",ST: Brand: CURAD

## (undated) DEVICE — SPLINT ORTH W3XL12IN LAYERED FBRGLS FOAM PD BRTH BK MOLD

## (undated) DEVICE — TUBING, SUCTION, 1/4" X 12', STRAIGHT: Brand: MEDLINE

## (undated) DEVICE — ZIMMER® STERILE DISPOSABLE TOURNIQUET CUFF WITH PLC, DUAL PORT, SINGLE BLADDER, 18 IN. (46 CM)

## (undated) DEVICE — SPONGE GZ W4XL4IN COT 12 PLY TYP VII WVN C FLD DSGN

## (undated) DEVICE — PENCIL ES L3M BTTN SWCH S STL HEX LOK BLDE ELECTRD HOLSTER

## (undated) DEVICE — SOLUTION IV IRRIG POUR BRL 0.9% SODIUM CHL 2F7124

## (undated) DEVICE — DRAPE HND W114XL142IN BLU POLYPR W O PCH FEN CRD AND TB HLDR

## (undated) DEVICE — SHEET,DRAPE,53X77,STERILE: Brand: MEDLINE

## (undated) DEVICE — BANDAGE COMPR W4INXL15FT BGE E SGL LAYERED CLP CLSR

## (undated) DEVICE — NEEDLE HYPO 25GA L1.5IN BVL ORIENTED ECLIPSE

## (undated) DEVICE — Device

## (undated) DEVICE — DRILL, AO, SCALED: Brand: VARIAX

## (undated) DEVICE — BANDAGE COMPR W4INXL12FT E DISP ESMARCH EVEN

## (undated) DEVICE — INTENDED FOR TISSUE SEPARATION, AND OTHER PROCEDURES THAT REQUIRE A SHARP SURGICAL BLADE TO PUNCTURE OR CUT.: Brand: BARD-PARKER ® STAINLESS STEEL BLADES

## (undated) DEVICE — CHLORAPREP 26ML ORANGE

## (undated) DEVICE — STRIP,CLOSURE,WOUND,MEDI-STRIP,1/2X4: Brand: MEDLINE